# Patient Record
(demographics unavailable — no encounter records)

---

## 2025-02-06 NOTE — ADDENDUM
[FreeTextEntry1] :  Documented by Viraj Mendoza acting as a scribe for NICOLÁS Randhawa  on 02/06/2025  .

## 2025-02-06 NOTE — HISTORY OF PRESENT ILLNESS
[de-identified] : Kristofer Santo is a 31 y/o M with history of vascular procedure who presents today for an initial consultation interested in bariatric surgery. Referred by Dr. Marquez. Patient states that the highest he has ever weighed was 400 lbs and lost over 100 lbs while he was incarcerated for 4 years. Today the patient weighs 360 lbs, and with a BMI of 50 believe that the patient is an ideal candidate for bariatric surgery. Pt denies any GERD. Will send us vascular records as procedure is unclear but pt denies any DT/PE hx. We discussed at length surgical and non-surgical options and that non-surgical approaches are unlikely to lead to long term, sustained weight loss. We also discussed that surgery alone is unlikely to be successful but should rather be seen as a tool for weight loss to be integrated with physical activity and nutritional counseling. All risks of surgery were explained to the patient including the risks of leaks, infection, blood clots, and death. The patient verbalized understanding and agreed to proceed with the evaluation. Will begin bariatric work up to move forward with possible bariatric surgery.

## 2025-02-06 NOTE — END OF VISIT
[Time Spent: ___ minutes] : I have spent [unfilled] minutes of time on the encounter which excludes teaching and separately reported services. [FreeTextEntry3] :  All medical record entries made by the Scribe were at my, NICOLÁS Moncada , direction and personally dictated by me on 02/06/2025 . I have reviewed the chart and agree that the record accurately reflects my personal performance of the history, physical exam, assessment and plan. I have also personally directed, reviewed, and agreed with the chart.

## 2025-02-06 NOTE — ASSESSMENT
[FreeTextEntry1] : Kristofer Santo is a 29 y/o M with history of vascular procedure who presents today for an initial consultation interested in bariatric surgery. Referred by Dr. Marquez. Patient states that the highest he has ever weighed was 400 lbs and lost over 100 lbs while he was incarcerated for 4 years. Today the patient weighs 360 lbs, and with a BMI of 50 believe that the patient is an ideal candidate for bariatric surgery. Pt denies any GERD. Will send us vascular records as procedure is unclear but pt denies any DT/PE hx. We discussed at length surgical and non-surgical options and that non-surgical approaches are unlikely to lead to long term, sustained weight loss. We also discussed that surgery alone is unlikely to be successful but should rather be seen as a tool for weight loss to be integrated with physical activity and nutritional counseling. All risks of surgery were explained to the patient including the risks of leaks, infection, blood clots, and death. The patient verbalized understanding and agreed to proceed with the evaluation. Will begin bariatric work up to move forward with possible bariatric surgery.

## 2025-02-06 NOTE — PLAN
[FreeTextEntry1] : - begin bariatric workup - plan for possible bariatric surgery  -obtain vascular records -preop pulm and UGI evaluations

## 2025-04-22 NOTE — HISTORY OF PRESENT ILLNESS
[TextBox_4] : Pt is a 30 year old male with medical history of   . Pt presents today for bariatric clearance with Dr Mejía date undetermined.

## 2025-07-14 NOTE — HISTORY OF PRESENT ILLNESS
[TextBox_4] : Pt is a 30 year old male with medical history of. Pt presents today for bariatric clearance with Dr Mejía date undetermined.